# Patient Record
Sex: MALE | Race: WHITE | Employment: UNEMPLOYED | URBAN - NONMETROPOLITAN AREA
[De-identification: names, ages, dates, MRNs, and addresses within clinical notes are randomized per-mention and may not be internally consistent; named-entity substitution may affect disease eponyms.]

---

## 2017-09-01 ENCOUNTER — NURSE TRIAGE (OUTPATIENT)
Dept: ADMINISTRATIVE | Age: 27
End: 2017-09-01

## 2017-09-03 ENCOUNTER — HOSPITAL ENCOUNTER (INPATIENT)
Age: 27
LOS: 1 days | Discharge: HOME | DRG: 442 | End: 2017-09-04
Attending: EMERGENCY MEDICINE | Admitting: INTERNAL MEDICINE

## 2017-09-03 DIAGNOSIS — B17.9 ACUTE HEPATITIS: Primary | ICD-10-CM

## 2017-09-03 PROBLEM — B15.9 ACUTE HEPATITIS A: Status: ACTIVE | Noted: 2017-09-03

## 2017-09-03 PROBLEM — B19.20 HEPATITIS C: Status: ACTIVE | Noted: 2017-09-03

## 2017-09-03 PROBLEM — F19.10 POLYSUBSTANCE ABUSE (HCC): Status: ACTIVE | Noted: 2017-09-03

## 2017-09-03 LAB
ALBUMIN SERPL-MCNC: 3.3 G/DL (ref 3.5–5.1)
ALBUMIN SERPL-MCNC: 3.4 G/DL (ref 3.5–5.1)
ALBUMIN SERPL-MCNC: 3.5 G/DL (ref 3.5–5.1)
ALP BLD-CCNC: 223 U/L (ref 38–126)
ALP BLD-CCNC: 223 U/L (ref 38–126)
ALP BLD-CCNC: 247 U/L (ref 38–126)
ALT SERPL-CCNC: 1031 U/L (ref 11–66)
ALT SERPL-CCNC: 928 U/L (ref 11–66)
ALT SERPL-CCNC: 929 U/L (ref 11–66)
AMMONIA: 89 UMOL/L (ref 11–60)
AMPHETAMINE+METHAMPHETAMINE URINE SCREEN: NEGATIVE
ANION GAP SERPL CALCULATED.3IONS-SCNC: 13 MEQ/L (ref 8–16)
ANISOCYTOSIS: ABNORMAL
AST SERPL-CCNC: 141 U/L (ref 5–40)
AST SERPL-CCNC: 143 U/L (ref 5–40)
AST SERPL-CCNC: 163 U/L (ref 5–40)
BACTERIA: ABNORMAL /HPF
BARBITURATE QUANTITATIVE URINE: NEGATIVE
BASOPHILS # BLD: 1.1 %
BASOPHILS ABSOLUTE: 0 THOU/MM3 (ref 0–0.1)
BENZODIAZEPINE QUANTITATIVE URINE: NEGATIVE
BILIRUB SERPL-MCNC: 4.4 MG/DL (ref 0.3–1.2)
BILIRUB SERPL-MCNC: 4.5 MG/DL (ref 0.3–1.2)
BILIRUB SERPL-MCNC: 4.9 MG/DL (ref 0.3–1.2)
BILIRUBIN DIRECT: 3 MG/DL (ref 0–0.3)
BILIRUBIN DIRECT: 3.4 MG/DL (ref 0–0.3)
BILIRUBIN DIRECT: 3.6 MG/DL (ref 0–0.3)
BILIRUBIN URINE: ABNORMAL
BLOOD, URINE: NEGATIVE
BUN BLDV-MCNC: 7 MG/DL (ref 7–22)
CALCIUM SERPL-MCNC: 9.2 MG/DL (ref 8.5–10.5)
CANNABINOID QUANTITATIVE URINE: POSITIVE
CASTS 2: ABNORMAL /LPF
CASTS UA: ABNORMAL /LPF
CHARACTER, URINE: ABNORMAL
CHLORIDE BLD-SCNC: 100 MEQ/L (ref 98–111)
CO2: 26 MEQ/L (ref 23–33)
COCAINE METABOLITE QUANTITATIVE URINE: POSITIVE
COLOR: ABNORMAL
CREAT SERPL-MCNC: 0.8 MG/DL (ref 0.4–1.2)
CRYSTALS, UA: ABNORMAL
EOSINOPHIL # BLD: 1.1 %
EOSINOPHILS ABSOLUTE: 0 THOU/MM3 (ref 0–0.4)
EPITHELIAL CELLS, UA: ABNORMAL /HPF
GFR SERPL CREATININE-BSD FRML MDRD: > 90 ML/MIN/1.73M2
GLUCOSE BLD-MCNC: 76 MG/DL (ref 70–108)
GLUCOSE URINE: NEGATIVE MG/DL
HAV IGM SER IA-ACNC: POSITIVE
HBV SURFACE AB TITR SER: NEGATIVE {TITER}
HCT VFR BLD CALC: 39.7 % (ref 42–52)
HEMOGLOBIN: 13.3 GM/DL (ref 14–18)
HEPATITIS B CORE IGM ANTIBODY: NEGATIVE
HEPATITIS B SURFACE ANTIGEN: NEGATIVE
HEPATITIS C ANTIBODY: ABNORMAL
ICTOTEST: POSITIVE
INR BLD: 0.89 (ref 0.85–1.13)
KETONES, URINE: NEGATIVE
LEUKOCYTE ESTERASE, URINE: NEGATIVE
LIPASE: 17.2 U/L (ref 5.6–51.3)
LYMPHOCYTES # BLD: 46.8 %
LYMPHOCYTES ABSOLUTE: 1.8 THOU/MM3 (ref 1–4.8)
MCH RBC QN AUTO: 28.7 PG (ref 27–31)
MCHC RBC AUTO-ENTMCNC: 33.6 GM/DL (ref 33–37)
MCV RBC AUTO: 85.7 FL (ref 80–94)
MISCELLANEOUS 2: ABNORMAL
MONOCYTES # BLD: 16 %
MONOCYTES ABSOLUTE: 0.6 THOU/MM3 (ref 0.4–1.3)
NITRITE, URINE: NEGATIVE
NUCLEATED RED BLOOD CELLS: 0 /100 WBC
OPIATES, URINE: NEGATIVE
OSMOLALITY CALCULATION: 274.3 MOSMOL/KG (ref 275–300)
OXYCODONE: NEGATIVE
PDW BLD-RTO: 16.8 % (ref 11.5–14.5)
PH UA: 7
PHENCYCLIDINE QUANTITATIVE URINE: NEGATIVE
PLATELET # BLD: 142 THOU/MM3 (ref 130–400)
PMV BLD AUTO: 9 MCM (ref 7.4–10.4)
POTASSIUM SERPL-SCNC: 3.7 MEQ/L (ref 3.5–5.2)
PROTEIN UA: NEGATIVE
RBC # BLD: 4.63 MILL/MM3 (ref 4.7–6.1)
RBC # BLD: NORMAL 10*6/UL
RBC URINE: ABNORMAL /HPF
RENAL EPITHELIAL, UA: ABNORMAL
SEG NEUTROPHILS: 35 %
SEGMENTED NEUTROPHILS ABSOLUTE COUNT: 1.4 THOU/MM3 (ref 1.8–7.7)
SODIUM BLD-SCNC: 139 MEQ/L (ref 135–145)
SPECIFIC GRAVITY, URINE: 1.01 (ref 1–1.03)
TOTAL PROTEIN: 6.7 G/DL (ref 6.1–8)
TOTAL PROTEIN: 7.3 G/DL (ref 6.1–8)
TOTAL PROTEIN: 7.3 G/DL (ref 6.1–8)
UROBILINOGEN, URINE: 1 EU/DL
WBC # BLD: 3.9 THOU/MM3 (ref 4.8–10.8)
WBC UA: ABNORMAL /HPF
YEAST: ABNORMAL

## 2017-09-03 PROCEDURE — 2580000003 HC RX 258: Performed by: EMERGENCY MEDICINE

## 2017-09-03 PROCEDURE — 36415 COLL VENOUS BLD VENIPUNCTURE: CPT

## 2017-09-03 PROCEDURE — 83690 ASSAY OF LIPASE: CPT

## 2017-09-03 PROCEDURE — 1200000000 HC SEMI PRIVATE

## 2017-09-03 PROCEDURE — 99285 EMERGENCY DEPT VISIT HI MDM: CPT

## 2017-09-03 PROCEDURE — 80074 ACUTE HEPATITIS PANEL: CPT

## 2017-09-03 PROCEDURE — 82248 BILIRUBIN DIRECT: CPT

## 2017-09-03 PROCEDURE — 81001 URINALYSIS AUTO W/SCOPE: CPT

## 2017-09-03 PROCEDURE — 85025 COMPLETE CBC W/AUTO DIFF WBC: CPT

## 2017-09-03 PROCEDURE — 87522 HEPATITIS C REVRS TRNSCRPJ: CPT

## 2017-09-03 PROCEDURE — 82140 ASSAY OF AMMONIA: CPT

## 2017-09-03 PROCEDURE — 80307 DRUG TEST PRSMV CHEM ANLYZR: CPT

## 2017-09-03 PROCEDURE — 96360 HYDRATION IV INFUSION INIT: CPT

## 2017-09-03 PROCEDURE — 86706 HEP B SURFACE ANTIBODY: CPT

## 2017-09-03 PROCEDURE — 80053 COMPREHEN METABOLIC PANEL: CPT

## 2017-09-03 PROCEDURE — 99223 1ST HOSP IP/OBS HIGH 75: CPT | Performed by: PHYSICIAN ASSISTANT

## 2017-09-03 PROCEDURE — 2700000000 HC OXYGEN THERAPY PER DAY

## 2017-09-03 PROCEDURE — 80076 HEPATIC FUNCTION PANEL: CPT

## 2017-09-03 PROCEDURE — 2580000003 HC RX 258: Performed by: PHYSICIAN ASSISTANT

## 2017-09-03 PROCEDURE — 85610 PROTHROMBIN TIME: CPT

## 2017-09-03 PROCEDURE — 87389 HIV-1 AG W/HIV-1&-2 AB AG IA: CPT

## 2017-09-03 RX ORDER — SODIUM CHLORIDE 9 MG/ML
INJECTION, SOLUTION INTRAVENOUS CONTINUOUS
Status: DISCONTINUED | OUTPATIENT
Start: 2017-09-03 | End: 2017-09-04 | Stop reason: HOSPADM

## 2017-09-03 RX ORDER — DIAZEPAM 5 MG/ML
2.5 INJECTION, SOLUTION INTRAMUSCULAR; INTRAVENOUS EVERY 6 HOURS PRN
Status: DISCONTINUED | OUTPATIENT
Start: 2017-09-03 | End: 2017-09-04 | Stop reason: HOSPADM

## 2017-09-03 RX ORDER — IBUPROFEN 400 MG/1
400 TABLET ORAL EVERY 6 HOURS PRN
Status: DISCONTINUED | OUTPATIENT
Start: 2017-09-03 | End: 2017-09-04 | Stop reason: HOSPADM

## 2017-09-03 RX ORDER — ACETAMINOPHEN 325 MG/1
650 TABLET ORAL EVERY 4 HOURS PRN
Status: DISCONTINUED | OUTPATIENT
Start: 2017-09-03 | End: 2017-09-03 | Stop reason: SDUPTHER

## 2017-09-03 RX ORDER — SODIUM CHLORIDE 0.9 % (FLUSH) 0.9 %
10 SYRINGE (ML) INJECTION PRN
Status: DISCONTINUED | OUTPATIENT
Start: 2017-09-03 | End: 2017-09-04 | Stop reason: HOSPADM

## 2017-09-03 RX ORDER — ONDANSETRON 2 MG/ML
4 INJECTION INTRAMUSCULAR; INTRAVENOUS EVERY 30 MIN PRN
Status: DISCONTINUED | OUTPATIENT
Start: 2017-09-03 | End: 2017-09-04 | Stop reason: HOSPADM

## 2017-09-03 RX ORDER — HYDROCODONE BITARTRATE AND ACETAMINOPHEN 5; 325 MG/1; MG/1
1 TABLET ORAL EVERY 4 HOURS PRN
Status: CANCELLED | OUTPATIENT
Start: 2017-09-03

## 2017-09-03 RX ORDER — SODIUM CHLORIDE 0.9 % (FLUSH) 0.9 %
10 SYRINGE (ML) INJECTION EVERY 12 HOURS SCHEDULED
Status: DISCONTINUED | OUTPATIENT
Start: 2017-09-03 | End: 2017-09-03 | Stop reason: SDUPTHER

## 2017-09-03 RX ORDER — SODIUM CHLORIDE 0.9 % (FLUSH) 0.9 %
10 SYRINGE (ML) INJECTION PRN
Status: DISCONTINUED | OUTPATIENT
Start: 2017-09-03 | End: 2017-09-03 | Stop reason: SDUPTHER

## 2017-09-03 RX ORDER — DIPHENHYDRAMINE HCL 25 MG
50 TABLET ORAL EVERY 6 HOURS PRN
Status: DISCONTINUED | OUTPATIENT
Start: 2017-09-03 | End: 2017-09-03

## 2017-09-03 RX ORDER — SUCRALFATE ORAL 1 G/10ML
1 SUSPENSION ORAL 4 TIMES DAILY
Status: DISCONTINUED | OUTPATIENT
Start: 2017-09-03 | End: 2017-09-03

## 2017-09-03 RX ORDER — SODIUM CHLORIDE 0.9 % (FLUSH) 0.9 %
10 SYRINGE (ML) INJECTION EVERY 12 HOURS SCHEDULED
Status: DISCONTINUED | OUTPATIENT
Start: 2017-09-03 | End: 2017-09-04 | Stop reason: HOSPADM

## 2017-09-03 RX ORDER — HYDROCODONE BITARTRATE AND ACETAMINOPHEN 5; 325 MG/1; MG/1
2 TABLET ORAL EVERY 4 HOURS PRN
Status: CANCELLED | OUTPATIENT
Start: 2017-09-03

## 2017-09-03 RX ORDER — DIPHENHYDRAMINE HCL 50 MG
50 CAPSULE ORAL EVERY 6 HOURS PRN
Status: DISCONTINUED | OUTPATIENT
Start: 2017-09-03 | End: 2017-09-03 | Stop reason: SDUPTHER

## 2017-09-03 RX ORDER — ACETAMINOPHEN 325 MG/1
650 TABLET ORAL EVERY 4 HOURS PRN
Status: DISCONTINUED | OUTPATIENT
Start: 2017-09-03 | End: 2017-09-03

## 2017-09-03 RX ORDER — 0.9 % SODIUM CHLORIDE 0.9 %
1000 INTRAVENOUS SOLUTION INTRAVENOUS ONCE
Status: COMPLETED | OUTPATIENT
Start: 2017-09-03 | End: 2017-09-03

## 2017-09-03 RX ADMIN — SODIUM CHLORIDE 1000 ML: 9 INJECTION, SOLUTION INTRAVENOUS at 04:12

## 2017-09-03 RX ADMIN — SODIUM CHLORIDE: 9 INJECTION, SOLUTION INTRAVENOUS at 05:37

## 2017-09-03 RX ADMIN — SODIUM CHLORIDE: 9 INJECTION, SOLUTION INTRAVENOUS at 13:30

## 2017-09-03 RX ADMIN — Medication 10 ML: at 21:14

## 2017-09-03 ASSESSMENT — ENCOUNTER SYMPTOMS
CONSTIPATION: 0
SHORTNESS OF BREATH: 0
BLOOD IN STOOL: 1
COLOR CHANGE: 1
WHEEZING: 0
BACK PAIN: 0
VOMITING: 0
NAUSEA: 0
SORE THROAT: 0
DIARRHEA: 0
COUGH: 0
ABDOMINAL PAIN: 0

## 2017-09-03 ASSESSMENT — PAIN DESCRIPTION - DESCRIPTORS
DESCRIPTORS: ACHING;HEADACHE
DESCRIPTORS_2: ACHING;STABBING;SORE

## 2017-09-03 ASSESSMENT — PAIN DESCRIPTION - LOCATION
LOCATION_2: BACK
LOCATION: HEAD

## 2017-09-03 ASSESSMENT — PAIN DESCRIPTION - FREQUENCY: FREQUENCY: CONTINUOUS

## 2017-09-03 ASSESSMENT — PAIN DESCRIPTION - PAIN TYPE
TYPE: ACUTE PAIN
TYPE_2: CHRONIC PAIN

## 2017-09-03 ASSESSMENT — PAIN SCALES - GENERAL
PAINLEVEL_OUTOF10: 3
PAINLEVEL_OUTOF10: 0

## 2017-09-03 ASSESSMENT — PAIN DESCRIPTION - PROGRESSION: CLINICAL_PROGRESSION_2: NOT CHANGED

## 2017-09-03 ASSESSMENT — PAIN DESCRIPTION - ONSET
ONSET: ON-GOING
ONSET_2: ON-GOING

## 2017-09-03 ASSESSMENT — PAIN DESCRIPTION - INTENSITY: RATING_2: 3

## 2017-09-03 ASSESSMENT — PAIN DESCRIPTION - DURATION: DURATION_2: CONTINUOUS

## 2017-09-04 ENCOUNTER — TELEPHONE (OUTPATIENT)
Dept: INTERNAL MEDICINE CLINIC | Age: 27
End: 2017-09-04

## 2017-09-04 ENCOUNTER — APPOINTMENT (OUTPATIENT)
Dept: ULTRASOUND IMAGING | Age: 27
DRG: 442 | End: 2017-09-04

## 2017-09-04 VITALS
OXYGEN SATURATION: 98 % | DIASTOLIC BLOOD PRESSURE: 76 MMHG | RESPIRATION RATE: 16 BRPM | BODY MASS INDEX: 27.26 KG/M2 | TEMPERATURE: 98 F | HEIGHT: 74 IN | HEART RATE: 60 BPM | WEIGHT: 212.4 LBS | SYSTOLIC BLOOD PRESSURE: 126 MMHG

## 2017-09-04 LAB
ALBUMIN SERPL-MCNC: 3.1 G/DL (ref 3.5–5.1)
ALP BLD-CCNC: 248 U/L (ref 38–126)
ALT SERPL-CCNC: 640 U/L (ref 11–66)
ANION GAP SERPL CALCULATED.3IONS-SCNC: 10 MEQ/L (ref 8–16)
ANISOCYTOSIS: ABNORMAL
AST SERPL-CCNC: 104 U/L (ref 5–40)
ATYPICAL LYMPHOCYTES: ABNORMAL %
BASOPHILIA: SLIGHT
BASOPHILS # BLD: 4.5 %
BASOPHILS ABSOLUTE: 0.2 THOU/MM3 (ref 0–0.1)
BILIRUB SERPL-MCNC: 2.8 MG/DL (ref 0.3–1.2)
BILIRUBIN DIRECT: 1.6 MG/DL (ref 0–0.3)
BUN BLDV-MCNC: 6 MG/DL (ref 7–22)
CALCIUM SERPL-MCNC: 8.8 MG/DL (ref 8.5–10.5)
CHLORIDE BLD-SCNC: 107 MEQ/L (ref 98–111)
CO2: 25 MEQ/L (ref 23–33)
CREAT SERPL-MCNC: 0.7 MG/DL (ref 0.4–1.2)
EOSINOPHIL # BLD: 2.3 %
EOSINOPHILS ABSOLUTE: 0.1 THOU/MM3 (ref 0–0.4)
GFR SERPL CREATININE-BSD FRML MDRD: > 90 ML/MIN/1.73M2
GLUCOSE BLD-MCNC: 103 MG/DL (ref 70–108)
HCT VFR BLD CALC: 37.4 % (ref 42–52)
HEMOGLOBIN: 12.4 GM/DL (ref 14–18)
INR BLD: 0.89 (ref 0.85–1.13)
LYMPHOCYTES # BLD: 59.8 %
LYMPHOCYTES ABSOLUTE: 2.5 THOU/MM3 (ref 1–4.8)
MCH RBC QN AUTO: 28.5 PG (ref 27–31)
MCHC RBC AUTO-ENTMCNC: 33.3 GM/DL (ref 33–37)
MCV RBC AUTO: 85.4 FL (ref 80–94)
MONOCYTES # BLD: 14.9 %
MONOCYTES ABSOLUTE: 0.6 THOU/MM3 (ref 0.4–1.3)
NUCLEATED RED BLOOD CELLS: 0 /100 WBC
PATHOLOGIST REVIEW: ABNORMAL
PDW BLD-RTO: 16.7 % (ref 11.5–14.5)
PLATELET # BLD: 162 THOU/MM3 (ref 130–400)
PLATELET ESTIMATE: ADEQUATE
PMV BLD AUTO: 9.5 MCM (ref 7.4–10.4)
POIKILOCYTES: ABNORMAL
POTASSIUM SERPL-SCNC: 4.7 MEQ/L (ref 3.5–5.2)
RBC # BLD: 4.37 MILL/MM3 (ref 4.7–6.1)
RBC # BLD: ABNORMAL 10*6/UL
SCAN OF BLOOD SMEAR: NORMAL
SEG NEUTROPHILS: 18.5 %
SEGMENTED NEUTROPHILS ABSOLUTE COUNT: 0.8 THOU/MM3 (ref 1.8–7.7)
SODIUM BLD-SCNC: 142 MEQ/L (ref 135–145)
TARGET CELLS: ABNORMAL
TOTAL PROTEIN: 6.9 G/DL (ref 6.1–8)
WBC # BLD: 4.2 THOU/MM3 (ref 4.8–10.8)

## 2017-09-04 PROCEDURE — 76705 ECHO EXAM OF ABDOMEN: CPT

## 2017-09-04 PROCEDURE — 82248 BILIRUBIN DIRECT: CPT

## 2017-09-04 PROCEDURE — 80053 COMPREHEN METABOLIC PANEL: CPT

## 2017-09-04 PROCEDURE — 36415 COLL VENOUS BLD VENIPUNCTURE: CPT

## 2017-09-04 PROCEDURE — 85610 PROTHROMBIN TIME: CPT

## 2017-09-04 PROCEDURE — 2580000003 HC RX 258: Performed by: EMERGENCY MEDICINE

## 2017-09-04 PROCEDURE — 85025 COMPLETE CBC W/AUTO DIFF WBC: CPT

## 2017-09-04 RX ADMIN — SODIUM CHLORIDE: 9 INJECTION, SOLUTION INTRAVENOUS at 00:21

## 2017-09-04 RX ADMIN — SODIUM CHLORIDE: 9 INJECTION, SOLUTION INTRAVENOUS at 09:34

## 2017-09-04 ASSESSMENT — PAIN SCALES - GENERAL
PAINLEVEL_OUTOF10: 0

## 2017-09-06 LAB — HIV-2 AB: NEGATIVE

## 2017-09-07 LAB
EER HCV RNA QNT PCR: NORMAL
HCV RNA QNT REAL-TIME PCR INTERP: NOT DETECTED
HCV RNA QUANT BDNA IU: < 1.2 LOG IU
HCV RNA QUANT PCR IU/ML: < 15 IU/ML

## 2018-10-26 ENCOUNTER — HOSPITAL ENCOUNTER (EMERGENCY)
Age: 28
Discharge: HOME | End: 2018-10-26
Attending: FAMILY MEDICINE
Payer: MEDICAID

## 2018-10-26 VITALS
OXYGEN SATURATION: 97 % | DIASTOLIC BLOOD PRESSURE: 73 MMHG | TEMPERATURE: 97.8 F | HEIGHT: 74 IN | WEIGHT: 209 LBS | HEART RATE: 62 BPM | SYSTOLIC BLOOD PRESSURE: 130 MMHG | BODY MASS INDEX: 26.82 KG/M2 | RESPIRATION RATE: 18 BRPM

## 2018-10-26 DIAGNOSIS — G43.809 OTHER MIGRAINE WITHOUT STATUS MIGRAINOSUS, NOT INTRACTABLE: Primary | ICD-10-CM

## 2018-10-26 DIAGNOSIS — L20.9 ATOPIC DERMATITIS, UNSPECIFIED TYPE: ICD-10-CM

## 2018-10-26 PROCEDURE — 99283 EMERGENCY DEPT VISIT LOW MDM: CPT

## 2018-10-26 RX ORDER — BUTALBITAL, ACETAMINOPHEN AND CAFFEINE 300; 40; 50 MG/1; MG/1; MG/1
1 CAPSULE ORAL EVERY 4 HOURS PRN
Qty: 10 CAPSULE | Refills: 0 | Status: SHIPPED | OUTPATIENT
Start: 2018-10-26 | End: 2019-02-19 | Stop reason: ALTCHOICE

## 2018-10-27 NOTE — ED PROVIDER NOTES
Constitutional: Patient is oriented to person, place, and time. Patient appears well-developed and well-nourished. Patient is active and cooperative. HENT:   Head: Normocephalic and atraumatic. Head is without contusion. Right Ear: Hearing and external ear normal. No drainage. Left Ear: Hearing and external ear normal. No drainage. Nose: Nose normal. No nasal deformity. No epistaxis. Mouth/Throat: Mucous membranes are not dry. Eyes: EOMI. Conjunctivae, sclera, and lids are normal. Right eye exhibits no discharge. Left eye exhibits no discharge. Neck: Full passive range of motion without pain and phonation normal.   Cardiovascular:  Normal rate, regular rhythm and intact distal pulses. Pulses: Right radial pulse  2+   Pulmonary/Chest: Effort normal. No tachypnea and no bradypnea. Abdominal: Soft. Patient without distension or tenderness  Musculoskeletal:   Negative acute trauma or deformity,  apparent full range of motion and normal strength all extremities appropriate to age. Neurological: Patient is alert and oriented to person, place, and time. patient displays no tremor. Patient displays no seizure activity. Lymphatic:  No cervical adenopathy  Skin: Skin is warm and dry. Patient is not diaphoretic. there is a noted macular papular rash most prominent in the bilateral antecubital fossa, though more subtly noted on patient's back chest and abdomen, positive blanching negative vesicles or bullae, negative Nikolsky   Psychiatric: Patient has a normal mood and affect.  Patient speech is normal and behavior is normal. Cognition and memory are normal.      DIFFERENTIAL DIAGNOSIS:   Atopic dermatitis, allergic reaction NOS, migraines/headaches/cephalalgia    DIAGNOSTIC RESULTS           RADIOLOGY: non-plain film images(s) such as CT, Ultrasound and MRI are read by the radiologist.  No orders to display       LABS:   Labs Reviewed - No data to display    EMERGENCY DEPARTMENT COURSE:   Vitals: Vitals:    10/26/18 1919   BP: 130/73   Pulse: 62   Resp: 18   Temp: 97.8 °F (36.6 °C)   TempSrc: Oral   SpO2: 97%   Weight: 209 lb (94.8 kg)   Height: 6' 2\" (1.88 m)     Patient history and physical exam taken at bedside, discussed patient's symptoms and exam finds, discussed clinical diagnosis atopic dermatitis, discussed using non-perfumed non-dye soap in the shower as well as in the laundry, Benadryl as needed for any itching. Discussed patient's reported migraines, patient does admit that he thinks he may need glasses I do recommend seeing an optometrist for appropriate fitting of glasses incorrectly used glasses Contributed to eye strain and headaches, I did discuss the patient his very high caffeine intake, I also discussed inappropriate use of NSAIDs/Tylenol which can have Significant Effect on His Body, after reviewing patient's OARRS report with him noting Suboxone October 2017, I will give a very limited amount of Fioricet for migraines, patient is strongly advised on the appropriate use of this medication and stay away from any alcohol with it, follow-up with PCP, acknowledges    FINAL IMPRESSION      1. Other migraine without status migrainosus, not intractable    2.  Atopic dermatitis, unspecified type          DISPOSITION/PLAN   Discharge    PATIENT REFERRED TO:  [facility] [address] [address] [phone]  Call         DISCHARGE MEDICATIONS:  Discharge Medication List as of 10/26/2018  8:01 PM      START taking these medications    Details   butalbital-APAP-caffeine (FIORICET) -40 MG CAPS per capsule Take 1 capsule by mouth every 4 hours as needed for Headaches, Disp-10 capsule, R-0Print                 Summation      Patient Course:  Discharge    ED Medications administered this visit:  Medications - No data to display    New Prescriptions from this visit:    Discharge Medication List as of 10/26/2018  8:01 PM      START taking these medications

## 2018-11-16 ENCOUNTER — HOSPITAL ENCOUNTER (EMERGENCY)
Age: 28
Discharge: HOME | End: 2018-11-16
Payer: MEDICAID

## 2018-11-16 VITALS
TEMPERATURE: 96.4 F | RESPIRATION RATE: 21 BRPM | OXYGEN SATURATION: 99 % | SYSTOLIC BLOOD PRESSURE: 148 MMHG | DIASTOLIC BLOOD PRESSURE: 75 MMHG | HEART RATE: 94 BPM

## 2018-11-16 DIAGNOSIS — T40.1X1A ACCIDENTAL OVERDOSE OF HEROIN, INITIAL ENCOUNTER (HCC): Primary | ICD-10-CM

## 2018-11-16 PROCEDURE — 99284 EMERGENCY DEPT VISIT MOD MDM: CPT

## 2018-11-16 NOTE — ED PROVIDER NOTES
oriented ×3 was discharged home in stable condition is to follow up with the Suboxone clinic. Strict return precautions and follow up instructions were discussed with the patient with which the patient agrees    ED Medications administered this visit: Medications - No data to display    CONSULTS: (None if blank)  None    Procedures: (None if blank)       CLINICAL IMPRESSION      1.  Accidental overdose of heroin, initial encounter [facility])          DISPOSITION/PLAN   DISPOSITION Decision To Discharge 11/16/2018 01:58:22 PM      PATIENT REFERRED TO:  [facility]  [address]  [address] [place] [address]  [phone]  Schedule an appointment as soon as possible for a visit         DISCHARGE MEDICATIONS:  Current Discharge Medication List                 (Please note that portions of this note were completed with avoice recognition program.  Efforts were made to edit the dictations but occasionally words are mis-transcribed.)      Electronically signed Michael Fernando PA-C on 11/16/18 at 1:54 PM             Mesfin Holder PA-C  11/16/18 [id number]

## 2019-02-19 ENCOUNTER — HOSPITAL ENCOUNTER (EMERGENCY)
Age: 29
Discharge: HOME | End: 2019-02-19
Attending: INTERNAL MEDICINE
Payer: MEDICAID

## 2019-02-19 VITALS
OXYGEN SATURATION: 98 % | DIASTOLIC BLOOD PRESSURE: 72 MMHG | HEIGHT: 74 IN | TEMPERATURE: 97.6 F | RESPIRATION RATE: 16 BRPM | SYSTOLIC BLOOD PRESSURE: 114 MMHG | WEIGHT: 241.6 LBS | BODY MASS INDEX: 31.01 KG/M2 | HEART RATE: 104 BPM

## 2019-02-19 DIAGNOSIS — J06.9 ACUTE UPPER RESPIRATORY INFECTION: Primary | ICD-10-CM

## 2019-02-19 DIAGNOSIS — J20.9 ACUTE BRONCHITIS, UNSPECIFIED ORGANISM: ICD-10-CM

## 2019-02-19 DIAGNOSIS — Z72.0 TOBACCO ABUSE: ICD-10-CM

## 2019-02-19 LAB
DIRECT EXAM: NORMAL
Lab: NORMAL
Lab: NORMAL
SPECIMEN DESCRIPTION: NORMAL
SPECIMEN DESCRIPTION: NORMAL

## 2019-02-19 PROCEDURE — 99283 EMERGENCY DEPT VISIT LOW MDM: CPT

## 2019-02-19 PROCEDURE — 6370000000 HC RX 637 (ALT 250 FOR IP): Performed by: INTERNAL MEDICINE

## 2019-02-19 PROCEDURE — 87880 STREP A ASSAY W/OPTIC: CPT

## 2019-02-19 PROCEDURE — 87804 INFLUENZA ASSAY W/OPTIC: CPT

## 2019-02-19 RX ORDER — IBUPROFEN 200 MG
200 TABLET ORAL EVERY 6 HOURS PRN
COMMUNITY

## 2019-02-19 RX ORDER — IBUPROFEN 200 MG
600 TABLET ORAL ONCE
Status: COMPLETED | OUTPATIENT
Start: 2019-02-19 | End: 2019-02-19

## 2019-02-19 RX ADMIN — IBUPROFEN 600 MG: 200 TABLET, FILM COATED ORAL at 12:15

## 2019-02-19 ASSESSMENT — PAIN SCALES - GENERAL: PAINLEVEL_OUTOF10: 6

## 2019-03-09 ENCOUNTER — HOSPITAL ENCOUNTER (EMERGENCY)
Age: 29
Discharge: HOME | End: 2019-03-09
Attending: FAMILY MEDICINE
Payer: COMMERCIAL

## 2019-03-09 VITALS
HEIGHT: 74 IN | BODY MASS INDEX: 31.95 KG/M2 | HEART RATE: 73 BPM | OXYGEN SATURATION: 100 % | SYSTOLIC BLOOD PRESSURE: 149 MMHG | TEMPERATURE: 98.3 F | DIASTOLIC BLOOD PRESSURE: 93 MMHG | WEIGHT: 249 LBS

## 2019-03-09 DIAGNOSIS — S91.012A LACERATION OF LEFT ANKLE, INITIAL ENCOUNTER: Primary | ICD-10-CM

## 2019-03-09 PROCEDURE — 12002 RPR S/N/AX/GEN/TRNK2.6-7.5CM: CPT

## 2019-03-09 PROCEDURE — 99282 EMERGENCY DEPT VISIT SF MDM: CPT

## 2019-03-09 ASSESSMENT — PAIN DESCRIPTION - ONSET: ONSET: SUDDEN

## 2019-03-09 ASSESSMENT — PAIN - FUNCTIONAL ASSESSMENT: PAIN_FUNCTIONAL_ASSESSMENT: PREVENTS OR INTERFERES SOME ACTIVE ACTIVITIES AND ADLS

## 2019-03-09 ASSESSMENT — PAIN SCALES - GENERAL: PAINLEVEL_OUTOF10: 2

## 2019-03-09 ASSESSMENT — PAIN DESCRIPTION - DESCRIPTORS: DESCRIPTORS: BURNING;CONSTANT

## 2019-03-09 ASSESSMENT — PAIN DESCRIPTION - PAIN TYPE: TYPE: ACUTE PAIN

## 2019-03-09 ASSESSMENT — PAIN DESCRIPTION - ORIENTATION: ORIENTATION: LEFT

## 2019-03-09 ASSESSMENT — PAIN DESCRIPTION - FREQUENCY: FREQUENCY: CONTINUOUS

## 2019-03-09 ASSESSMENT — PAIN DESCRIPTION - PROGRESSION: CLINICAL_PROGRESSION: NOT CHANGED

## 2019-03-09 ASSESSMENT — PAIN DESCRIPTION - LOCATION: LOCATION: ANKLE

## 2019-04-24 ENCOUNTER — HOSPITAL ENCOUNTER (OUTPATIENT)
Age: 29
Setting detail: SPECIMEN
Discharge: HOME | End: 2019-04-24
Payer: COMMERCIAL

## 2019-04-24 PROCEDURE — 87186 SC STD MICRODIL/AGAR DIL: CPT

## 2019-04-24 PROCEDURE — 86403 PARTICLE AGGLUT ANTBDY SCRN: CPT

## 2019-04-24 PROCEDURE — 87070 CULTURE OTHR SPECIMN AEROBIC: CPT

## 2019-04-24 PROCEDURE — 87205 SMEAR GRAM STAIN: CPT

## 2019-04-27 LAB
CULTURE: ABNORMAL
DIRECT EXAM: ABNORMAL
DIRECT EXAM: ABNORMAL
Lab: ABNORMAL
SPECIMEN DESCRIPTION: ABNORMAL

## 2020-03-27 ENCOUNTER — HOSPITAL ENCOUNTER (EMERGENCY)
Age: 30
Discharge: HOME | End: 2020-03-27
Payer: MEDICAID

## 2020-03-27 VITALS
HEART RATE: 68 BPM | DIASTOLIC BLOOD PRESSURE: 90 MMHG | RESPIRATION RATE: 16 BRPM | TEMPERATURE: 98.4 F | WEIGHT: 235 LBS | OXYGEN SATURATION: 98 % | BODY MASS INDEX: 30.17 KG/M2 | SYSTOLIC BLOOD PRESSURE: 137 MMHG

## 2020-03-27 PROCEDURE — 99282 EMERGENCY DEPT VISIT SF MDM: CPT

## 2020-03-27 RX ORDER — CLINDAMYCIN HYDROCHLORIDE 300 MG/1
300 CAPSULE ORAL 3 TIMES DAILY
Qty: 30 CAPSULE | Refills: 0 | Status: SHIPPED | OUTPATIENT
Start: 2020-03-27 | End: 2020-04-06

## 2020-03-27 RX ORDER — NAPROXEN 500 MG/1
500 TABLET ORAL 2 TIMES DAILY
Qty: 14 TABLET | Refills: 0 | Status: SHIPPED | OUTPATIENT
Start: 2020-03-27

## 2020-03-27 RX ORDER — BUPRENORPHINE AND NALOXONE 8; 2 MG/1; MG/1
1 FILM, SOLUBLE BUCCAL; SUBLINGUAL DAILY
COMMUNITY

## 2020-03-27 ASSESSMENT — PAIN SCALES - GENERAL
PAINLEVEL_OUTOF10: 10
PAINLEVEL_OUTOF10: 10

## 2020-03-27 ASSESSMENT — PAIN - FUNCTIONAL ASSESSMENT: PAIN_FUNCTIONAL_ASSESSMENT: 0-10

## 2020-03-27 ASSESSMENT — PAIN DESCRIPTION - ONSET: ONSET: GRADUAL

## 2020-03-27 ASSESSMENT — PAIN DESCRIPTION - PROGRESSION: CLINICAL_PROGRESSION: GRADUALLY WORSENING

## 2020-03-27 ASSESSMENT — PAIN DESCRIPTION - DESCRIPTORS: DESCRIPTORS: ACHING

## 2020-03-27 ASSESSMENT — PAIN DESCRIPTION - LOCATION: LOCATION: MOUTH

## 2020-03-27 ASSESSMENT — PAIN DESCRIPTION - FREQUENCY: FREQUENCY: CONTINUOUS

## 2020-03-27 ASSESSMENT — PAIN DESCRIPTION - PAIN TYPE: TYPE: ACUTE PAIN

## 2020-12-03 ENCOUNTER — HOSPITAL ENCOUNTER (EMERGENCY)
Age: 30
Discharge: HOME | End: 2020-12-03
Payer: MEDICAID

## 2020-12-03 ENCOUNTER — APPOINTMENT (OUTPATIENT)
Dept: GENERAL RADIOLOGY | Age: 30
End: 2020-12-03
Payer: MEDICAID

## 2020-12-03 VITALS
HEART RATE: 99 BPM | WEIGHT: 235 LBS | DIASTOLIC BLOOD PRESSURE: 98 MMHG | HEIGHT: 74 IN | SYSTOLIC BLOOD PRESSURE: 181 MMHG | OXYGEN SATURATION: 98 % | BODY MASS INDEX: 30.16 KG/M2 | RESPIRATION RATE: 20 BRPM

## 2020-12-03 LAB
-: ABNORMAL
ACETAMINOPHEN LEVEL: <5 UG/ML (ref 10–30)
AMORPHOUS: ABNORMAL
AMPHETAMINE SCREEN URINE: POSITIVE
ANION GAP SERPL CALCULATED.3IONS-SCNC: 10 MMOL/L (ref 9–17)
BACTERIA: ABNORMAL
BARBITURATE SCREEN URINE: NEGATIVE
BENZODIAZEPINE SCREEN, URINE: POSITIVE
BILIRUBIN URINE: NEGATIVE
BUN BLDV-MCNC: 9 MG/DL (ref 6–20)
BUN/CREAT BLD: 10 (ref 9–20)
BUPRENORPHINE URINE: NEGATIVE
CALCIUM SERPL-MCNC: 9.5 MG/DL (ref 8.6–10.4)
CANNABINOID SCREEN URINE: POSITIVE
CASTS UA: ABNORMAL /LPF
CHLORIDE BLD-SCNC: 99 MMOL/L (ref 98–107)
CO2: 30 MMOL/L (ref 20–31)
COCAINE METABOLITE, URINE: NEGATIVE
COLOR: YELLOW
COMMENT UA: ABNORMAL
CREAT SERPL-MCNC: 0.92 MG/DL (ref 0.7–1.2)
CRYSTALS, UA: ABNORMAL /HPF
EPITHELIAL CELLS UA: ABNORMAL /HPF (ref 0–5)
ETHANOL PERCENT: <0.01 %
ETHANOL: <10 MG/DL
GFR AFRICAN AMERICAN: >60 ML/MIN
GFR NON-AFRICAN AMERICAN: >60 ML/MIN
GFR SERPL CREATININE-BSD FRML MDRD: ABNORMAL ML/MIN/{1.73_M2}
GFR SERPL CREATININE-BSD FRML MDRD: ABNORMAL ML/MIN/{1.73_M2}
GLUCOSE BLD-MCNC: 99 MG/DL (ref 70–99)
GLUCOSE URINE: NEGATIVE
HCT VFR BLD CALC: 36.9 % (ref 40.7–50.3)
HEMOGLOBIN: 11.8 G/DL (ref 13–17)
KETONES, URINE: NEGATIVE
LEUKOCYTE ESTERASE, URINE: NEGATIVE
MCH RBC QN AUTO: 28.5 PG (ref 25.2–33.5)
MCHC RBC AUTO-ENTMCNC: 32 G/DL (ref 28.4–34.8)
MCV RBC AUTO: 89.1 FL (ref 82.6–102.9)
MDMA URINE: ABNORMAL
METHADONE SCREEN, URINE: NEGATIVE
METHAMPHETAMINE, URINE: POSITIVE
MUCUS: ABNORMAL
NITRITE, URINE: NEGATIVE
NRBC AUTOMATED: 0 PER 100 WBC
OPIATES, URINE: NEGATIVE
OTHER OBSERVATIONS UA: ABNORMAL
OXYCODONE SCREEN URINE: NEGATIVE
PDW BLD-RTO: 13.8 % (ref 11.8–14.4)
PH UA: 7.5 (ref 5–9)
PHENCYCLIDINE, URINE: NEGATIVE
PLATELET # BLD: 203 K/UL (ref 138–453)
PMV BLD AUTO: 9.6 FL (ref 8.1–13.5)
POTASSIUM SERPL-SCNC: 3.3 MMOL/L (ref 3.7–5.3)
PROPOXYPHENE, URINE: NEGATIVE
PROTEIN UA: ABNORMAL
RBC # BLD: 4.14 M/UL (ref 4.21–5.77)
RBC UA: ABNORMAL /HPF (ref 0–2)
RENAL EPITHELIAL, UA: ABNORMAL /HPF
SALICYLATE LEVEL: <1 MG/DL (ref 3–10)
SODIUM BLD-SCNC: 139 MMOL/L (ref 135–144)
SPECIFIC GRAVITY UA: 1.02 (ref 1.01–1.02)
TEST INFORMATION: ABNORMAL
TRICHOMONAS: ABNORMAL
TRICYCLIC ANTIDEPRESSANTS, UR: NEGATIVE
TURBIDITY: ABNORMAL
URINE HGB: NEGATIVE
UROBILINOGEN, URINE: NORMAL
WBC # BLD: 4.5 K/UL (ref 3.5–11.3)
WBC UA: ABNORMAL /HPF (ref 0–5)
YEAST: ABNORMAL

## 2020-12-03 PROCEDURE — 80307 DRUG TEST PRSMV CHEM ANLYZR: CPT

## 2020-12-03 PROCEDURE — G0480 DRUG TEST DEF 1-7 CLASSES: HCPCS

## 2020-12-03 PROCEDURE — 80048 BASIC METABOLIC PNL TOTAL CA: CPT

## 2020-12-03 PROCEDURE — 81001 URINALYSIS AUTO W/SCOPE: CPT

## 2020-12-03 PROCEDURE — 85027 COMPLETE CBC AUTOMATED: CPT

## 2020-12-03 PROCEDURE — 82805 BLOOD GASES W/O2 SATURATION: CPT

## 2020-12-03 PROCEDURE — 71045 X-RAY EXAM CHEST 1 VIEW: CPT

## 2020-12-03 PROCEDURE — 99284 EMERGENCY DEPT VISIT MOD MDM: CPT

## 2020-12-03 PROCEDURE — 36415 COLL VENOUS BLD VENIPUNCTURE: CPT

## 2020-12-03 PROCEDURE — 80306 DRUG TEST PRSMV INSTRMNT: CPT

## 2020-12-03 RX ORDER — LORAZEPAM 2 MG/ML
1 INJECTION INTRAMUSCULAR ONCE
Status: DISCONTINUED | OUTPATIENT
Start: 2020-12-03 | End: 2020-12-03 | Stop reason: HOSPADM

## 2020-12-03 RX ORDER — NALOXONE HYDROCHLORIDE 4 MG/.1ML
1 SPRAY NASAL PRN
Status: DISCONTINUED | OUTPATIENT
Start: 2020-12-03 | End: 2020-12-03 | Stop reason: HOSPADM

## 2020-12-03 RX ORDER — ONDANSETRON 2 MG/ML
4 INJECTION INTRAMUSCULAR; INTRAVENOUS ONCE
Status: DISCONTINUED | OUTPATIENT
Start: 2020-12-03 | End: 2020-12-03 | Stop reason: HOSPADM

## 2020-12-03 ASSESSMENT — ENCOUNTER SYMPTOMS
NAUSEA: 0
WHEEZING: 0
CONSTIPATION: 0
RHINORRHEA: 0
BLOOD IN STOOL: 0
VOMITING: 1
SHORTNESS OF BREATH: 0
COUGH: 0
ABDOMINAL PAIN: 0
EYE REDNESS: 0
EYE DISCHARGE: 0
CHEST TIGHTNESS: 0
BACK PAIN: 0
DIARRHEA: 0
SORE THROAT: 0

## 2020-12-03 NOTE — ED NOTES
Bed: 03  Expected date:   Expected time:   Means of arrival:   Comments:  Medic 2 overdose     Mj Paez RN  12/03/20 9279

## 2020-12-03 NOTE — ED PROVIDER NOTES
Allergic/Immunologic: Negative for food allergies and immunocompromised state. Neurological: Negative for dizziness, syncope, weakness and light-headedness. Hematological: Negative for adenopathy. Does not bruise/bleed easily. Psychiatric/Behavioral: Negative for behavioral problems and suicidal ideas. The patient is not nervous/anxious. Except as noted above the remainder of the review of systems was reviewed and negative. PAST MEDICAL HISTORY     Past Medical History:   Diagnosis Date    Back pain     Drug addiction ([facility] [address]          SURGICALHISTORY     No past surgical history on file. CURRENT MEDICATIONS       Discharge Medication List as of 12/3/2020  6:50 PM      CONTINUE these medications which have NOT CHANGED    Details   buprenorphine-naloxone (SUBOXONE) 8-2 MG FILM SL film Place 1 Film under the tongue daily. Historical Med      naproxen (NAPROSYN) 500 MG tablet Take 1 tablet by mouth 2 times daily, Disp-14 tablet, R-0Normal      ibuprofen (ADVIL;MOTRIN) 200 MG tablet Take 200 mg by mouth every 6 hours as needed for PainHistorical Med             ALLERGIES     Patient has no known allergies. FAMILY HISTORY     No family history on file.        SOCIAL HISTORY       Social History     Socioeconomic History    Marital status: Single     Spouse name: Not on file    Number of children: Not on file    Years of education: Not on file    Highest education level: Not on file   Occupational History    Not on file   Social Needs    Financial resource strain: Not on file    Food insecurity     Worry: Not on file     Inability: Not on file    Transportation needs     Medical: Not on file     Non-medical: Not on file   Tobacco Use    Smoking status: Current Every Day Smoker     Packs/day: 1.00     Types: Cigarettes    Smokeless tobacco: Current User   Substance and Sexual Activity    Alcohol use: No    Drug use: No     Types: IV, Opiates , Cocaine     Comment: history of drug use  Sexual activity: Yes     Partners: Female   Lifestyle    Physical activity     Days per week: Not on file     Minutes per session: Not on file    Stress: Not on file   Relationships    Social connections     Talks on phone: Not on file     Gets together: Not on file     Attends Holiness service: Not on file     Active member of club or organization: Not on file     Attends meetings of clubs or organizations: Not on file     Relationship status: Not on file    Intimate partner violence     Fear of current or ex partner: Not on file     Emotionally abused: Not on file     Physically abused: Not on file     Forced sexual activity: Not on file   Other Topics Concern    Not on file   Social History Narrative    Not on file       SCREENINGS      @XPLE([id number])@      PHYSICAL EXAM    (up to 7 for level 4, 8 or more for level 5)     ED Triage Vitals [12/03/20 1747]   BP Temp Temp src Pulse Resp SpO2 Height Weight   (!) 210/143 -- -- 99 20 98 % 6' 2\" (1.88 m) 235 lb (106.6 kg)       Physical Exam  Vitals signs and nursing note reviewed. Constitutional:       General: He is not in acute distress. Appearance: He is well-developed. He is not diaphoretic. HENT:      Head: Normocephalic and atraumatic. Right Ear: External ear normal.      Left Ear: External ear normal.      Mouth/Throat:      Pharynx: No oropharyngeal exudate. Eyes:      General: No scleral icterus. Right eye: No discharge. Left eye: No discharge. Conjunctiva/sclera: Conjunctivae normal.      Pupils: Pupils are equal, round, and reactive to light. Neck:      Musculoskeletal: Normal range of motion and neck supple. Thyroid: No thyromegaly. Trachea: No tracheal deviation. Cardiovascular:      Rate and Rhythm: Normal rate and regular rhythm. Heart sounds: No murmur. No friction rub. No gallop. Pulmonary:      Effort: Pulmonary effort is normal. No respiratory distress.       Breath sounds: Normal breath sounds. No stridor. No wheezing. Abdominal:      General: Bowel sounds are normal. There is no distension. Palpations: Abdomen is soft. Tenderness: There is no abdominal tenderness. There is no guarding or rebound. Musculoskeletal: Normal range of motion. General: No tenderness or deformity. Lymphadenopathy:      Cervical: No cervical adenopathy. Skin:     General: Skin is warm and dry. Capillary Refill: Capillary refill takes less than 2 seconds. Findings: No erythema or rash. Neurological:      General: No focal deficit present. Mental Status: He is alert and oriented to person, place, and time. Cranial Nerves: No cranial nerve deficit. Motor: No abnormal muscle tone. Deep Tendon Reflexes: Reflexes are normal and symmetric. Reflexes normal.   Psychiatric:         Behavior: Behavior is agitated, aggressive and combative. DIAGNOSTIC RESULTS     EKG: All EKG's are interpreted by the Emergency Department Physician who either signs orCo-signs this chart in the absence of a cardiologist.      RADIOLOGY:   Non-plainfilm images such as CT, Ultrasound and MRI are read by the radiologist. Plain radiographic images are visualized and preliminarily interpreted by the emergency physician with the below findings:      Interpretationper the Radiologist below, if available at the time of this note:    XR CHEST PORTABLE   Final Result   No acute cardiopulmonary pathology.                ED BEDSIDE ULTRASOUND:   Performed by ED Physician - none    LABS:  Labs Reviewed   CBC - Abnormal; Notable for the following components:       Result Value    RBC 4.14 (*)     Hemoglobin 11.8 (*)     Hematocrit 36.9 (*)     All other components within normal limits   BASIC METABOLIC PANEL - Abnormal; Notable for the following components:    Potassium 3.3 (*)     All other components within normal limits   URINE RT REFLEX TO CULTURE - Abnormal; Notable for the following components:    Turbidity UA SLIGHTLY CLOUDY (*)     Specific Gravity, UA 1.025 (*)     Protein, UA 2+ (*)     All other components within normal limits   URINE DRUG SCREEN - Abnormal; Notable for the following components:    Amphetamine Screen, Ur POSITIVE (*)     Benzodiazepine Screen, Urine POSITIVE (*)     Cannabinoid Scrn, Ur POSITIVE (*)     Methamphetamine, Urine POSITIVE (*)     All other components within normal limits   MICROSCOPIC URINALYSIS - Abnormal; Notable for the following components:    Bacteria, UA 2+ (*)     Mucus, UA 2+ (*)     Amorphous, UA 1+ (*)     All other components within normal limits   ETHANOL   BLOOD GAS, VENOUS   SALICYLATE LEVEL   ACETAMINOPHEN LEVEL       All other labs were within normal range or not returned as of this dictation. EMERGENCY DEPARTMENT COURSE and DIFFERENTIAL DIAGNOSIS/MDM:   Vitals:    Vitals:    12/03/20 1747 12/03/20 1919   BP: (!) 210/143 (!) 181/98   Pulse: 99    Resp: 20    SpO2: 98%    Weight: 235 lb (106.6 kg)    Height: 6' 2\" (1.88 m)          MDM  60-year-old male known heroin user who reports he is on Suboxone but not today. Appears from home after EMS was called due to unresponsiveness. He was given Narcan and bystander prior to arrival.  He is awake alert oriented he was very agitated and violent with : [employer] Fleischer. He is awake alert and oriented. I discussed with him that he probably is having partial reaction secondary to the fact that he is on Suboxone and is now actively using. Will get basic blood work and monitor here. He reports this was not intentional.    Repeat evaluation patient states that he is having thoughts of possibly wanting to harm himself these seem passive. However I think he will benefit from psychiatric evaluation and even potential admission. Police are here to take him to senior care. And they are a safe site for psychiatric evaluation.   They will be calling the counseling services to have him screened when he [facility] the [facility]itent[facility]. I think he is medically stable to sit in penitentiary. I discussed with my attending who agrees. Patient will be discharged to the penitentiary where he will set and be observed my recommendations are one-to-one to ensure he does not need any further Narcan. A Narcan pack was sent with him to the penitentiary. Patient is eating and drinking laughing on his phone and appears in no distress. He is stable from an emergent medical standpoint. Procedures    FINAL IMPRESSION      1. Drug overdose, undetermined intent, initial encounter        DISPOSITION/PLAN   DISPOSITION Decision To Discharge 12/03/2020 06:44:11 PM      PATIENT REFERRED TO:  [facility] ED  [address] [facility]  [phone]    If symptoms worsen, As needed    [facility]:  Discharge Medication List as of 12/3/2020  6:50 PM                 Summation      Patient Course:      ED Medications administered this visit:    Medications   ondansetron (ZOFRAN) injection 4 mg (4 mg Intravenous Not Given 12/3/20 1845)   LORazepam (ATIVAN) injection 1 mg (1 mg Intravenous Not Given 12/3/20 1845)   naloxone (NALOXONE TO-GO) 4 mg/0.1 mL nasal spray (1 spray Nasal Furnished to Patient 12/3/20 1918)       New Prescriptions from this visit:    Discharge Medication List as of 12/3/2020  6:50 PM          Follow-up:  [facility]  [address] [facility]  [phone]    If symptoms worsen, As needed    [place] [address]  [phone]            Final Impression:   1.  Drug overdose, undetermined intent, initial encounter               (Please note that portions of this note were completed with a voice recognition program.  Efforts were made to edit the dictations but occasionally words are mis-transcribed.)           Felicia Carr PA-C  12/03/20 2002

## 2021-01-18 ENCOUNTER — HOSPITAL ENCOUNTER (OUTPATIENT)
Age: 31
Setting detail: SPECIMEN
Discharge: HOME | End: 2021-01-18
Payer: COMMERCIAL

## 2021-01-18 PROCEDURE — U0003 INFECTIOUS AGENT DETECTION BY NUCLEIC ACID (DNA OR RNA); SEVERE ACUTE RESPIRATORY SYNDROME CORONAVIRUS 2 (SARS-COV-2) (CORONAVIRUS DISEASE [COVID-19]), AMPLIFIED PROBE TECHNIQUE, MAKING USE OF HIGH THROUGHPUT TECHNOLOGIES AS DESCRIBED BY CMS-2020-01-R: HCPCS

## 2021-01-21 LAB — SARS-COV-2, NAA: NOT DETECTED

## 2023-01-06 ENCOUNTER — HOSPITAL ENCOUNTER (EMERGENCY)
Age: 33
Discharge: HOME | End: 2023-01-06
Attending: EMERGENCY MEDICINE
Payer: COMMERCIAL

## 2023-01-06 VITALS
HEART RATE: 50 BPM | OXYGEN SATURATION: 95 % | SYSTOLIC BLOOD PRESSURE: 141 MMHG | TEMPERATURE: 97.5 F | RESPIRATION RATE: 18 BRPM | DIASTOLIC BLOOD PRESSURE: 93 MMHG

## 2023-01-06 DIAGNOSIS — K02.9 PAIN DUE TO DENTAL CARIES: Primary | ICD-10-CM

## 2023-01-06 PROCEDURE — 6370000000 HC RX 637 (ALT 250 FOR IP): Performed by: EMERGENCY MEDICINE

## 2023-01-06 PROCEDURE — 99283 EMERGENCY DEPT VISIT LOW MDM: CPT

## 2023-01-06 RX ORDER — LIDOCAINE HYDROCHLORIDE 20 MG/ML
15 SOLUTION OROPHARYNGEAL ONCE
Status: COMPLETED | OUTPATIENT
Start: 2023-01-06 | End: 2023-01-06

## 2023-01-06 RX ORDER — AMOXICILLIN AND CLAVULANATE POTASSIUM 875; 125 MG/1; MG/1
1 TABLET, FILM COATED ORAL 2 TIMES DAILY
Qty: 20 TABLET | Refills: 0 | Status: SHIPPED | OUTPATIENT
Start: 2023-01-06 | End: 2023-01-06 | Stop reason: SDUPTHER

## 2023-01-06 RX ORDER — CHLORHEXIDINE GLUCONATE 0.12 MG/ML
15 RINSE ORAL 2 TIMES DAILY
Qty: 420 ML | Refills: 0 | Status: SHIPPED | OUTPATIENT
Start: 2023-01-06 | End: 2023-01-20

## 2023-01-06 RX ORDER — ACETAMINOPHEN 500 MG
1000 TABLET ORAL EVERY 6 HOURS PRN
COMMUNITY

## 2023-01-06 RX ORDER — CHLORHEXIDINE GLUCONATE 0.12 MG/ML
15 RINSE ORAL 2 TIMES DAILY
Qty: 420 ML | Refills: 0 | Status: SHIPPED | OUTPATIENT
Start: 2023-01-06 | End: 2023-01-06 | Stop reason: SDUPTHER

## 2023-01-06 RX ORDER — AMOXICILLIN AND CLAVULANATE POTASSIUM 875; 125 MG/1; MG/1
1 TABLET, FILM COATED ORAL ONCE
Status: COMPLETED | OUTPATIENT
Start: 2023-01-06 | End: 2023-01-06

## 2023-01-06 RX ORDER — AMOXICILLIN AND CLAVULANATE POTASSIUM 875; 125 MG/1; MG/1
1 TABLET, FILM COATED ORAL 2 TIMES DAILY
Qty: 20 TABLET | Refills: 0 | Status: SHIPPED | OUTPATIENT
Start: 2023-01-06 | End: 2023-01-16

## 2023-01-06 RX ADMIN — BENZOCAINE, BUTAMBEN, AND TETRACAINE HYDROCHLORIDE 1 SPRAY: .028; .004; .004 AEROSOL, SPRAY TOPICAL at 09:11

## 2023-01-06 RX ADMIN — AMOXICILLIN AND CLAVULANATE POTASSIUM 1 TABLET: 875; 125 TABLET, FILM COATED ORAL at 09:11

## 2023-01-06 RX ADMIN — LIDOCAINE HYDROCHLORIDE 15 ML: 20 SOLUTION ORAL; TOPICAL at 09:12

## 2023-01-06 ASSESSMENT — PAIN - FUNCTIONAL ASSESSMENT: PAIN_FUNCTIONAL_ASSESSMENT: 0-10

## 2023-01-06 ASSESSMENT — PAIN DESCRIPTION - ORIENTATION: ORIENTATION: RIGHT;LOWER

## 2023-01-06 ASSESSMENT — PAIN DESCRIPTION - PAIN TYPE: TYPE: CHRONIC PAIN;ACUTE PAIN

## 2023-01-06 ASSESSMENT — PAIN DESCRIPTION - FREQUENCY: FREQUENCY: CONTINUOUS

## 2023-01-06 ASSESSMENT — PAIN DESCRIPTION - DESCRIPTORS: DESCRIPTORS: SORE

## 2023-01-06 ASSESSMENT — PAIN SCALES - GENERAL: PAINLEVEL_OUTOF10: 10

## 2023-01-06 ASSESSMENT — PAIN DESCRIPTION - LOCATION: LOCATION: TEETH

## 2023-01-06 ASSESSMENT — ENCOUNTER SYMPTOMS: FACIAL SWELLING: 0

## 2023-01-06 NOTE — ED PROVIDER NOTES
[address]      Pt Name: eFlipa Johnson  MRN: [id number]  [place] 1990  Date of evaluation: 1/6/2023  Provider: Warren De Jesus DO    CHIEF COMPLAINT       Chief Complaint   Patient presents with    Dental Pain     Lower right dental pain, states he wants antibiotics. HISTORY OF PRESENT ILLNESS      Felipa Johnson is a 33 y.o. male who presents to the emergency department with complaint of dental pain that began this morning. The patient works third shift and he states that he was on his way home when his lower jaw on the right side began to hurt. The patient is on Suboxone, and reports he does not want any narcotics. He simply wants antibiotics. He already has his own dentist, he does smoke cigarettes. He reports no trismus, elevation of the tongue, facial swelling, fevers or chills. REVIEW OF SYSTEMS       Review of Systems   HENT:  Positive for dental problem. Negative for ear discharge, ear pain and facial swelling. PAST MEDICAL HISTORY     Past Medical History:   Diagnosis Date    Back pain     Drug addiction ([facility] [address]          SURGICAL HISTORY       History reviewed. No pertinent surgical history. CURRENT MEDICATIONS       Discharge Medication List as of 1/6/2023  9:48 AM        CONTINUE these medications which have NOT CHANGED    Details   acetaminophen (TYLENOL) 500 MG tablet Take 1,000 mg by mouth every 6 hours as needed for PainHistorical Med      buprenorphine-naloxone (SUBOXONE) 8-2 MG FILM SL film Place 1 Film under the tongue daily. Historical Med      naproxen (NAPROSYN) 500 MG tablet Take 1 tablet by mouth 2 times daily, Disp-14 tablet, R-0Normal      ibuprofen (ADVIL;MOTRIN) 200 MG tablet Take 200 mg by mouth every 6 hours as needed for PainHistorical Med             ALLERGIES       Patient has no known allergies. FAMILY HISTORY       History reviewed. No pertinent family history.        SOCIAL HISTORY       Social History Tobacco Use    Smoking status: Every Day     Packs/day: 1.00     Types: Cigarettes    Smokeless tobacco: Current   Vaping Use    Vaping Use: Every day   Substance Use Topics    Alcohol use: No    Drug use: No     Types: IV, Opiates , Cocaine     Comment: history of drug use         PHYSICAL EXAM       ED Triage Vitals [01/06/23 0834]   BP Temp Temp Source Heart Rate Resp SpO2 Height Weight   (!) 168/106 97.5 °F (36.4 °C) Tympanic (!) 47 18 99 % -- --       Physical Exam  Vitals and nursing note reviewed. HENT:      Head: Normocephalic. Right Ear: External ear normal.      Left Ear: External ear normal.      Mouth/Throat:      Lips: Pink. Mouth: Mucous membranes are moist. No angioedema. Dentition: Dental caries present. No dental abscesses. Tongue: No lesions. Tongue does not deviate from midline. Comments: No trismus, no elevation of the tongue, the floor of the mouth at the base the tongue is soft and not woody in texture  Eyes:      Conjunctiva/sclera: Conjunctivae normal.   Cardiovascular:      Rate and Rhythm: Regular rhythm. Bradycardia present. Pulmonary:      Effort: Pulmonary effort is normal.   Abdominal:      Palpations: Abdomen is soft. Musculoskeletal:      Cervical back: Neck supple. Skin:     General: Skin is warm and dry. Neurological:      General: No focal deficit present. Mental Status: He is alert and oriented to person, place, and time. DIAGNOSTIC RESULTS         No orders to display         LABS:  Labs Reviewed - No data to display    All other labs were within normal range or not returned as of this dictation. EMERGENCY DEPARTMENT COURSE and DIFFERENTIAL DIAGNOSIS/MDM:     This is a 64-year-old male who is presenting with dental pain likely from severe dental caries, he has multiple teeth that need to be extracted. He has his own dentist.  He does not want any narcotics. Will provide localized relief and Augmentin.           REASSESSMENT CRITICAL CARE TIME     PROCEDURES:  Unless otherwise noted below, none     Procedures    FINAL IMPRESSION      1.  Pain due to dental caries          DISPOSITION/PLAN     DISPOSITION Decision To Discharge 01/06/2023 09:17:41 AM      PATIENT REFERRED TO:  [address]  [phone]    If symptoms worsen    DISCHARGE MEDICATIONS:  Discharge Medication List as of 1/6/2023  9:48 AM          (Please note that portions of this note were completed with a voice recognition program.  Efforts were made to edit the dictations but occasionally words are mis-transcribed.)    Ray Colin DO (electronically signed)  Attending Emergency Physician           Ray Colin DO  01/06/23 9602

## 2023-01-06 NOTE — DISCHARGE INSTRUCTIONS
Make sure to take antibiotics as prescribed. Take Tylenol and Motrin alternating. Follow-up with a dentist  As we discussed if you have any worsening symptoms, have a high fever, swelling to your face especially below your jaw, have elevation of your tongue or any other concerning symptom, return here urgently.

## 2023-02-18 ENCOUNTER — HOSPITAL ENCOUNTER (EMERGENCY)
Age: 33
Discharge: HOME | End: 2023-02-18
Attending: EMERGENCY MEDICINE
Payer: COMMERCIAL

## 2023-02-18 VITALS
HEART RATE: 77 BPM | OXYGEN SATURATION: 98 % | TEMPERATURE: 97.4 F | DIASTOLIC BLOOD PRESSURE: 103 MMHG | SYSTOLIC BLOOD PRESSURE: 136 MMHG | RESPIRATION RATE: 19 BRPM

## 2023-02-18 DIAGNOSIS — K08.89 TOOTHACHE: Primary | ICD-10-CM

## 2023-02-18 PROCEDURE — 6370000000 HC RX 637 (ALT 250 FOR IP): Performed by: EMERGENCY MEDICINE

## 2023-02-18 PROCEDURE — 99283 EMERGENCY DEPT VISIT LOW MDM: CPT

## 2023-02-18 RX ORDER — AMOXICILLIN AND CLAVULANATE POTASSIUM 875; 125 MG/1; MG/1
1 TABLET, FILM COATED ORAL 2 TIMES DAILY
Qty: 20 TABLET | Refills: 0 | Status: SHIPPED | OUTPATIENT
Start: 2023-02-18 | End: 2023-02-28

## 2023-02-18 RX ORDER — AMOXICILLIN AND CLAVULANATE POTASSIUM 875; 125 MG/1; MG/1
1 TABLET, FILM COATED ORAL ONCE
Status: COMPLETED | OUTPATIENT
Start: 2023-02-18 | End: 2023-02-18

## 2023-02-18 RX ORDER — NAPROXEN 500 MG/1
500 TABLET ORAL 2 TIMES DAILY WITH MEALS
Qty: 10 TABLET | Refills: 0 | Status: SHIPPED | OUTPATIENT
Start: 2023-02-18 | End: 2023-02-23

## 2023-02-18 RX ADMIN — AMOXICILLIN AND CLAVULANATE POTASSIUM 1 TABLET: 875; 125 TABLET, FILM COATED ORAL at 18:11

## 2023-02-18 ASSESSMENT — PAIN DESCRIPTION - LOCATION: LOCATION: TEETH

## 2023-02-18 ASSESSMENT — PAIN SCALES - GENERAL: PAINLEVEL_OUTOF10: 6

## 2023-02-18 ASSESSMENT — PAIN - FUNCTIONAL ASSESSMENT: PAIN_FUNCTIONAL_ASSESSMENT: 0-10

## 2023-02-18 NOTE — DISCHARGE INSTRUCTIONS
I recommend that he follow-up with a dentist within the next 24 to 48 hours. You have been given a dental referral list which may help you locate a dentist if you are unable to follow-up with your own. As we discussed, you may also consider following up at one of the [facility] dental clinics. Please see the list of clinics available at this website:    [email or web address]    Return to the ED if you develop worsening pain, fever, difficulty swallowing, difficulty breathing or other concerns. Do not take ibuprofen or Aleve while taking the prescribed naproxen. You may continue to take Tylenol.

## 2023-02-19 NOTE — ED PROVIDER NOTES
[address]      Pt Name: Fuentes Jett  MRN: [id number]  [place] 1990  Date of evaluation: 2/18/2023  Provider: Karen Quezada MD    CHIEF COMPLAINT       Chief Complaint   Patient presents with    Dental Pain     Left and right side, ongoing, multiple broken teeth         HISTORY OF PRESENT ILLNESS      Fuentes Jett is a 33 y.o. male who presents to the emergency department for evaluation of dental pain. States that he has had pain to the bilateral upper teeth over the past few days that has been gradually worsening. He notes a history of poor dentition and states that he is supposed to have multiple teeth extracted but has not been able to do so yet because of cost.  He had a similar episode last year which improved with antibiotics. Was subsequently seen by a dentist and then referred to an oral surgeon, though has had no intervention yet. Denies any difficulty swallowing or breathing. Does have some pain with chewing. No fever. No nausea or vomiting. No other complaints. PAST MEDICAL HISTORY     Past Medical History:   Diagnosis Date    Back pain     Drug addiction [facility])        CURRENT MEDICATIONS       Discharge Medication List as of 2/18/2023  6:04 PM        CONTINUE these medications which have NOT CHANGED    Details   acetaminophen (TYLENOL) 500 MG tablet Take 1,000 mg by mouth every 6 hours as needed for PainHistorical Med      buprenorphine-naloxone (SUBOXONE) 8-2 MG FILM SL film Place 1 Film under the tongue daily. Historical Med      ibuprofen (ADVIL;MOTRIN) 200 MG tablet Take 200 mg by mouth every 6 hours as needed for PainHistorical Med             ALLERGIES       Patient has no known allergies.       SOCIAL HISTORY       Social History     Tobacco Use    Smoking status: Every Day     Packs/day: 1.00     Types: Cigarettes    Smokeless tobacco: Current   Vaping Use    Vaping Use: Every day   Substance Use Topics    Alcohol use: No    Drug use: No     Types: IV, Opiates , Cocaine     Comment: history of drug use         PHYSICAL EXAM       ED Triage Vitals   BP Temp Temp src Heart Rate Resp SpO2 Height Weight   02/18/23 1748 02/18/23 1746 -- 02/18/23 1746 02/18/23 1746 02/18/23 1746 -- --   (!) 136/103 97.4 °F (36.3 °C)  77 19 98 %         Physical Exam  Vitals reviewed. Constitutional:       General: He is not in acute distress. Appearance: He is not ill-appearing, toxic-appearing or diaphoretic. HENT:      Head: Normocephalic and atraumatic. Comments: No facial swelling     Nose: Nose normal.      Mouth/Throat:      Comments: Oral mucosa pink and moist.  No erythema or exudate. There is no trismus. No sublingual induration. No visible abscess. Patient has generally poor dentition with multiple missing teeth; most remaining teeth are in disrepair. Pulmonary:      Breath sounds: No stridor. Musculoskeletal:      Cervical back: Neck supple. No rigidity or tenderness. Lymphadenopathy:      Cervical: No cervical adenopathy. Skin:     General: Skin is warm and dry. Neurological:      General: No focal deficit present. Mental Status: He is alert and oriented to person, place, and time. EMERGENCY DEPARTMENT COURSE and DIFFERENTIAL DIAGNOSIS/MDM:     Stable and well-appearing. Presents with dental pain across multiple teeth in the upper jaw. There is no evident periapical abscess. No trismus. No other findings of concern. Suspect likely underlying infectious process. No ANUG. We will start on Augmentin. Scheduled naproxen for pain. Advised to follow-up with his dentist.        FINAL IMPRESSION      1.  Toothache          DISPOSITION/PLAN     DISPOSITION Decision To Discharge 02/18/2023 06:01:39 PM      DISCHARGE MEDICATIONS:  Discharge Medication List as of 2/18/2023  6:04 PM        START taking these medications    Details   amoxicillin-clavulanate (AUGMENTIN) 875-125 MG per tablet Take 1 tablet by mouth 2 times daily for 10 days, Disp-20 tablet, R-0Normal             (Please note that portions of this note were completed with a voice recognition program.  Efforts were made to edit the dictations but occasionally words are mis-transcribed.)    Karly Arvizu MD (electronically signed)  Attending Emergency Physician            Karly Arvizu MD  02/19/23 [id number]

## 2023-03-12 ENCOUNTER — HOSPITAL ENCOUNTER (EMERGENCY)
Age: 33
Discharge: HOME | End: 2023-03-12
Payer: COMMERCIAL

## 2023-03-12 VITALS
SYSTOLIC BLOOD PRESSURE: 146 MMHG | DIASTOLIC BLOOD PRESSURE: 95 MMHG | HEART RATE: 72 BPM | RESPIRATION RATE: 12 BRPM | TEMPERATURE: 97.2 F

## 2023-03-12 DIAGNOSIS — K08.89 PAIN, DENTAL: Primary | ICD-10-CM

## 2023-03-12 PROCEDURE — 99283 EMERGENCY DEPT VISIT LOW MDM: CPT

## 2023-03-12 RX ORDER — CLINDAMYCIN HYDROCHLORIDE 300 MG/1
300 CAPSULE ORAL 2 TIMES DAILY
Qty: 14 CAPSULE | Refills: 0 | Status: SHIPPED | OUTPATIENT
Start: 2023-03-12 | End: 2023-03-19

## 2023-03-12 ASSESSMENT — ENCOUNTER SYMPTOMS
GASTROINTESTINAL NEGATIVE: 1
RESPIRATORY NEGATIVE: 1

## 2023-03-12 NOTE — DISCHARGE INSTRUCTIONS
Follow-up with your dentist as scheduled. Take clindamycin as directed. Take Tylenol or Motrin as directed for discomfort. Promptly return to emergency department for new, changing, worsening of symptoms or other concerns.

## 2023-03-12 NOTE — ED PROVIDER NOTES
[facility] ED  EMERGENCY DEPARTMENT ENCOUNTER      Pt Name: Antony Bowen  MRN: [id number]  [place] 1990  Date of evaluation: 3/12/2023  Provider: Rhina Thapa PA-C    CHIEF COMPLAINT       Chief Complaint   Patient presents with    Dental Pain     Has multiple bad teeth. Appt 3/20 with [facility] antibioitics for pain. HISTORY OF PRESENT ILLNESS   (Location/Symptom, Timing/Onset, Context/Setting, Quality, Duration, Modifying Factors, Severity)  Note limiting factors. Antony Bowen is a 33 y.o. male who presents to the emergency department [facility] chronic dental pain reports right upper dental pain over the past 2 months that recently worsened. Patient reports he is scheduled to see a dentist to have several teeth pulled on 20 March. Patient denies history of fever facial swelling difficulty swallowing or other complaints. No other symptoms noted, patient has no additional complaints. HPI    Nursing Notes were reviewed. REVIEW OF SYSTEMS    (2-9 systems for level 4, 10 or more for level 5)     Review of Systems   Constitutional: Negative. HENT:          See hpi   Respiratory: Negative. Cardiovascular: Negative. Gastrointestinal: Negative. Genitourinary: Negative. Musculoskeletal: Negative. Skin: Negative. Except as noted above the remainder of the review of systems was reviewed and negative. PAST MEDICAL HISTORY     Past Medical History:   Diagnosis Date    Back pain     Drug addiction ([facility] [address]          SURGICAL HISTORY     History reviewed. No pertinent surgical history. CURRENT MEDICATIONS       Previous Medications    ACETAMINOPHEN (TYLENOL) 500 MG TABLET    Take 1,000 mg by mouth every 6 hours as needed for Pain    BUPRENORPHINE-NALOXONE (SUBOXONE) 8-2 MG FILM SL FILM    Place 1 Film under the tongue daily.     IBUPROFEN (ADVIL;MOTRIN) 200 MG TABLET    Take 200 mg by mouth every 6 hours as needed for Pain    NAPROXEN (NAPROSYN) 500 MG TABLET    Take 1 tablet by mouth 2 times daily (with meals) for 5 days       ALLERGIES     Patient has no known allergies. FAMILY HISTORY     History reviewed. No pertinent family history. SOCIAL HISTORY       Social History     Socioeconomic History    Marital status: Single     Spouse name: None    Number of children: None    Years of education: None    Highest education level: None   Tobacco Use    Smoking status: Every Day     Packs/day: 1.00     Types: Cigarettes    Smokeless tobacco: Current   Vaping Use    Vaping Use: Every day   Substance and Sexual Activity    Alcohol use: No    Drug use: No     Types: IV, Opiates , Cocaine     Comment: history of drug use    Sexual activity: Yes     Partners: Female       SCREENINGS         [place] Coma Scale  Eye Opening: Spontaneous  Best Verbal Response: Oriented  Best Motor Response: Obeys commands  [place] Coma Scale Score: 15                     CIWA Assessment  BP: (!) 146/95  Heart Rate: 72                 PHYSICAL EXAM    (up to 7 for level 4, 8 or more for level 5)     ED Triage Vitals   BP Temp Temp Source Heart Rate Resp SpO2 Height Weight   03/12/23 1027 03/12/23 1026 03/12/23 1026 03/12/23 1027 03/12/23 1027 -- -- --   (!) 146/95 97.2 °F (36.2 °C) Oral 72 12          Physical Exam  Vitals and nursing note reviewed. Constitutional:       General: He is not in acute distress. Appearance: Normal appearance. He is not ill-appearing, toxic-appearing or diaphoretic. HENT:      Head: Normocephalic and atraumatic. Nose: Nose normal.      Mouth/Throat:      Mouth: Mucous membranes are moist.     Eyes:      Extraocular Movements: Extraocular movements intact. Cardiovascular:      Rate and Rhythm: Normal rate and regular rhythm. Pulses: Normal pulses. Heart sounds: Normal heart sounds. Pulmonary:      Effort: Pulmonary effort is normal.      Breath sounds: Normal breath sounds.    Musculoskeletal:      Cervical back: Normal range of motion and neck supple. Skin:     General: Skin is dry. Capillary Refill: Capillary refill takes less than 2 seconds. Neurological:      General: No focal deficit present. Mental Status: He is alert. Mental status is at baseline. Psychiatric:         Mood and Affect: Mood normal.         Behavior: Behavior normal.       DIAGNOSTIC RESULTS       Interpretation per the Radiologist below, if available at the time of this note:    No orders to display         ED BEDSIDE ULTRASOUND:   Performed by ED Physician - none    LABS:  Labs Reviewed - No data to display    All other labs were within normal range or not returned as of this dictation. EMERGENCY DEPARTMENT COURSE and DIFFERENTIAL DIAGNOSIS/MDM:   Vitals:    Vitals:    03/12/23 1026 03/12/23 1027   BP:  (!) 146/95   Pulse:  72   Resp:  12   Temp: 97.2 °F (36.2 °C)    TempSrc: Oral            Medical Decision Making  Risk  Prescription drug management. 30-year-old nontoxic appearing male presents emergency department for evaluation of chronic dental pain that is worsened over the past few days. Patient is scheduled to see a dentist to have several teeth removed on 20 March. Exam demonstrates global dental decay without gingival abscess. Patient states clindamycin works well for him. I discussed with him need to keep appointment with dentist.  Patient demonstrates no dangerous process. REASSESSMENT          CRITICAL CARE TIME   Total Critical Care time was  minutes, excluding separately reportable procedures. There was a high probability of clinically significant/life threatening deterioration in the patient's condition which required my urgent intervention. CONSULTS:  None    PROCEDURES:  Unless otherwise noted below, none     Procedures        FINAL IMPRESSION      1.  Pain, dental Stable         DISPOSITION/PLAN   DISPOSITION Decision To Discharge 03/12/2023 11:18:56 AM      PATIENT REFERRED TO:  No follow-up provider specified. DISCHARGE MEDICATIONS:  New Prescriptions    CLINDAMYCIN (CLEOCIN) 300 MG CAPSULE    Take 1 capsule by mouth 2 times daily for 7 days     Controlled Substances Monitoring:     No flowsheet data found. (Please note that portions of this note were completed with a voice recognition program.  Efforts were made to edit the dictations but occasionally words are mis-transcribed.)    Zafar Barreto PA-C (electronically signed)  Attending Emergency Physician      .      Zafar Barreto PA-C  03/12/23 1124       Clare Dahl MD  03/12/23 1439

## 2024-11-06 ENCOUNTER — HOSPITAL ENCOUNTER (EMERGENCY)
Age: 34
Discharge: HOME | End: 2024-11-06
Attending: EMERGENCY MEDICINE
Payer: COMMERCIAL

## 2024-11-06 VITALS
DIASTOLIC BLOOD PRESSURE: 105 MMHG | HEART RATE: 64 BPM | SYSTOLIC BLOOD PRESSURE: 153 MMHG | OXYGEN SATURATION: 100 % | RESPIRATION RATE: 16 BRPM | TEMPERATURE: 97.2 F

## 2024-11-06 DIAGNOSIS — K02.9 DENTAL DECAY: ICD-10-CM

## 2024-11-06 DIAGNOSIS — K08.89 PAIN, DENTAL: Primary | ICD-10-CM

## 2024-11-06 DIAGNOSIS — K05.10 GINGIVITIS: ICD-10-CM

## 2024-11-06 PROCEDURE — 99283 EMERGENCY DEPT VISIT LOW MDM: CPT

## 2024-11-06 PROCEDURE — 6370000000 HC RX 637 (ALT 250 FOR IP): Performed by: EMERGENCY MEDICINE

## 2024-11-06 RX ORDER — PENICILLIN V POTASSIUM 500 MG/1
500 TABLET, FILM COATED ORAL 3 TIMES DAILY
Qty: 30 TABLET | Refills: 0 | Status: SHIPPED | OUTPATIENT
Start: 2024-11-06 | End: 2024-11-16

## 2024-11-06 RX ORDER — LIDOCAINE HYDROCHLORIDE 20 MG/ML
15 SOLUTION OROPHARYNGEAL
Status: DISCONTINUED | OUTPATIENT
Start: 2024-11-06 | End: 2024-11-06 | Stop reason: HOSPADM

## 2024-11-06 RX ORDER — CHLORHEXIDINE GLUCONATE ORAL RINSE 1.2 MG/ML
15 SOLUTION DENTAL 2 TIMES DAILY
Qty: 420 ML | Refills: 0 | Status: SHIPPED | OUTPATIENT
Start: 2024-11-06 | End: 2024-11-20

## 2024-11-06 RX ORDER — IBUPROFEN 400 MG/1
400 TABLET, FILM COATED ORAL EVERY 6 HOURS PRN
Qty: 30 TABLET | Refills: 0 | Status: SHIPPED | OUTPATIENT
Start: 2024-11-06

## 2024-11-06 RX ADMIN — LIDOCAINE HYDROCHLORIDE 15 ML: 20 SOLUTION ORAL at 12:05

## 2024-11-06 RX ADMIN — BENZOCAINE, BUTAMBEN, AND TETRACAINE HYDROCHLORIDE 10 SPRAY: .028; .004; .004 AEROSOL, SPRAY TOPICAL at 12:05

## 2024-11-06 ASSESSMENT — PAIN - FUNCTIONAL ASSESSMENT: PAIN_FUNCTIONAL_ASSESSMENT: 0-10

## 2024-11-06 ASSESSMENT — PAIN SCALES - GENERAL: PAINLEVEL_OUTOF10: 9

## 2024-11-06 ASSESSMENT — PAIN DESCRIPTION - LOCATION: LOCATION: TEETH

## 2024-11-06 NOTE — DISCHARGE INSTRUCTIONS
Take penicillin as directed until complete.  Motrin every 6-8 hours as needed for pain.  May use Tylenol as well as directed for pain control.  Use Norco place of Tylenol for pain not controlled with over-the-counter medications.  Apply a small amount of topical medication on a cotton swab or a Q-tip and leave in place for 30 minutes.  May repeat every 4-6 hours as needed for pain.  Use Peridex daily as directed.  Follow-up with a dentist as soon as possible.  Please return immediately should he develop any worsening symptoms or any other acute concerns

## 2024-11-06 NOTE — ED PROVIDER NOTES
[facility] ED  EMERGENCY DEPARTMENT ENCOUNTER      Pt Name: Damian Diana  MRN: [id number]  Birthdate 1990  Date of evaluation: 11/6/2024  Provider: Annabella Del Toro MD    CHIEF COMPLAINT       Chief Complaint   Patient presents with    Dental Pain     Right upper.  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            HISTORY OF PRESENT ILLNESS   (Location/Symptom, Timing/Onset, Context/Setting, Quality, Duration, Modifying Factors, Severity)  Note limiting factors.   Damian Diana is a 34 y.o. male who presents to the emergency department      34-year-old male presented emergency department for evaluation of right upper dental pain.  Patient has a known history of extensive dental decay.  Reports he began to have throbbing pain in his right upper jaw starting yesterday.  No facial swelling.  No redness or warmth or drainage.  No injury.  Patient did use some leftover chlorhexidine mouthwash that did help a little and has been taking Motrin but he has persistent pain this morning despite over-the-counter Motrin.  He does have a dentist to follow-up with but could not wait for an appointment due to this discomfort.  No other acute concern        Nursing Notes were reviewed.    REVIEW OF SYSTEMS    (2-9 systems for level 4, 10 or more for level 5)     Review of Systems   All other systems reviewed and are negative.      Except as noted above the remainder of the review of systems was reviewed and negative.       PAST MEDICAL HISTORY     Past Medical History:   Diagnosis Date    Back pain     Drug addiction (H[facility])          SURGICAL HISTORY     No past surgical history on file.      CURRENT MEDICATIONS       Previous Medications    ACETAMINOPHEN (TYLENOL) 500 MG TABLET    Take 1,000 mg by mouth every 6 hours as needed for Pain    BUPRENORPHINE-NALOXONE (SUBOXONE) 8-2 MG FILM SL FILM    Place 1 Film under the tongue daily.    IBUPROFEN (ADVIL;MOTRIN) 200 MG

## 2025-05-22 ENCOUNTER — OFFICE VISIT (OUTPATIENT)
Dept: PRIMARY CARE CLINIC | Age: 35
End: 2025-05-22
Payer: COMMERCIAL

## 2025-05-22 ENCOUNTER — HOSPITAL ENCOUNTER (EMERGENCY)
Age: 35
Discharge: HOME | End: 2025-05-22

## 2025-05-22 VITALS
DIASTOLIC BLOOD PRESSURE: 88 MMHG | BODY MASS INDEX: 32.79 KG/M2 | HEIGHT: 73 IN | OXYGEN SATURATION: 97 % | WEIGHT: 247.4 LBS | HEART RATE: 85 BPM | SYSTOLIC BLOOD PRESSURE: 132 MMHG

## 2025-05-22 DIAGNOSIS — Z76.89 ENCOUNTER TO ESTABLISH CARE: Primary | ICD-10-CM

## 2025-05-22 DIAGNOSIS — R10.31 ACUTE ABDOMINAL PAIN IN RIGHT LOWER QUADRANT: ICD-10-CM

## 2025-05-22 PROCEDURE — 4004F PT TOBACCO SCREEN RCVD TLK: CPT | Performed by: NURSE PRACTITIONER

## 2025-05-22 PROCEDURE — G8427 DOCREV CUR MEDS BY ELIG CLIN: HCPCS | Performed by: NURSE PRACTITIONER

## 2025-05-22 PROCEDURE — G2211 COMPLEX E/M VISIT ADD ON: HCPCS | Performed by: NURSE PRACTITIONER

## 2025-05-22 PROCEDURE — G8419 CALC BMI OUT NRM PARAM NOF/U: HCPCS | Performed by: NURSE PRACTITIONER

## 2025-05-22 PROCEDURE — 99215 OFFICE O/P EST HI 40 MIN: CPT | Performed by: NURSE PRACTITIONER

## 2025-05-22 RX ORDER — ONDANSETRON 4 MG/1
4 TABLET, FILM COATED ORAL EVERY 8 HOURS PRN
COMMUNITY
Start: 2025-04-15

## 2025-05-22 SDOH — ECONOMIC STABILITY: FOOD INSECURITY: WITHIN THE PAST 12 MONTHS, THE FOOD YOU BOUGHT JUST DIDN'T LAST AND YOU DIDN'T HAVE MONEY TO GET MORE.: NEVER TRUE

## 2025-05-22 SDOH — ECONOMIC STABILITY: FOOD INSECURITY: WITHIN THE PAST 12 MONTHS, YOU WORRIED THAT YOUR FOOD WOULD RUN OUT BEFORE YOU GOT MONEY TO BUY MORE.: NEVER TRUE

## 2025-05-22 ASSESSMENT — PATIENT HEALTH QUESTIONNAIRE - PHQ9
9. THOUGHTS THAT YOU WOULD BE BETTER OFF DEAD, OR OF HURTING YOURSELF: NOT AT ALL
4. FEELING TIRED OR HAVING LITTLE ENERGY: SEVERAL DAYS
SUM OF ALL RESPONSES TO PHQ QUESTIONS 1-9: 5
10. IF YOU CHECKED OFF ANY PROBLEMS, HOW DIFFICULT HAVE THESE PROBLEMS MADE IT FOR YOU TO DO YOUR WORK, TAKE CARE OF THINGS AT HOME, OR GET ALONG WITH OTHER PEOPLE: SOMEWHAT DIFFICULT
SUM OF ALL RESPONSES TO PHQ QUESTIONS 1-9: 5
6. FEELING BAD ABOUT YOURSELF - OR THAT YOU ARE A FAILURE OR HAVE LET YOURSELF OR YOUR FAMILY DOWN: NOT AT ALL
2. FEELING DOWN, DEPRESSED OR HOPELESS: NOT AT ALL
8. MOVING OR SPEAKING SO SLOWLY THAT OTHER PEOPLE COULD HAVE NOTICED. OR THE OPPOSITE, BEING SO FIGETY OR RESTLESS THAT YOU HAVE BEEN MOVING AROUND A LOT MORE THAN USUAL: NOT AT ALL
1. LITTLE INTEREST OR PLEASURE IN DOING THINGS: SEVERAL DAYS
5. POOR APPETITE OR OVEREATING: SEVERAL DAYS
SUM OF ALL RESPONSES TO PHQ QUESTIONS 1-9: 5
SUM OF ALL RESPONSES TO PHQ QUESTIONS 1-9: 5
7. TROUBLE CONCENTRATING ON THINGS, SUCH AS READING THE NEWSPAPER OR WATCHING TELEVISION: SEVERAL DAYS
3. TROUBLE FALLING OR STAYING ASLEEP: SEVERAL DAYS

## 2025-05-22 ASSESSMENT — ENCOUNTER SYMPTOMS
ABDOMINAL DISTENTION: 0
ABDOMINAL PAIN: 1
NAUSEA: 1
DIARRHEA: 1
VOMITING: 1

## 2025-05-22 NOTE — PROGRESS NOTES
[facility]      Patient:  Damian Diana 34 y.o. male       Date of Service: 05/22/25      Chief Complaint:   Chief Complaint   Patient presents with    New Patient    Establish Care     Patient presents today to establish care.  Previously seen Dr. Osorio when he was a child. He used the ER a lot.    Abdominal Pain     Patient thought he had norovirus and he gets like a sensation of hurt burn, puking,diarrhea (10-11 yesterday), not drinking much. Before he came here he took a drink of water and it was hurting his stomach and his girlfriend and him stopped at [employer] in [place] and he took a drink of her slushy and he states it hurts all over his stomach and cramping.  Started: On and off four times within the last month.  Methadone clinic he takes a liquid and was started two weeks ago. Started 25 and is now up to 95.       History of Present Illness   Abdominal pain  - Off and on for a month  - nausea, vomiting, and diarrhea   - Burning sensation with eating and drinking        Allergies:    Patient has no known allergies.    Problem List:  Patient Active Problem List   Diagnosis    Acute hepatitis A    Acute hepatitis    Hepatitis C    Polysubstance abuse (HCC)    Tobacco use        Medication List:    Prior to Admission medications    Medication Sig Start Date End Date Taking? Authorizing Provider   ondansetron (ZOFRAN) 4 MG tablet Take 1 tablet by mouth every 8 hours as needed 4/15/25  Yes Provider, MD Jose   ibuprofen (IBU) 400 MG tablet Take 1 tablet by mouth every 6 hours as needed for Pain 11/6/24  Yes Annabella Del Toro MD       Surgical History:  History reviewed. No pertinent surgical history.     Family History:  Family History   Problem Relation Age of Onset    Dementia Maternal Grandmother     No Known Problems Paternal Grandmother     No Known Problems Paternal Grandfather         Social History:  Social History     Socioeconomic History    Marital status: Single     Spouse

## 2025-05-22 NOTE — ED TRIAGE NOTES
Patient called into Triage Room. Patient walks into triage room is pale and diaphoretic. He ask how long this will take. This writer responded with approximately 3 hours. Patient then looks at the 2 women with him and turns around decides to not be seen at this time and walks out of the ER

## 2025-05-23 ENCOUNTER — TELEPHONE (OUTPATIENT)
Dept: PRIMARY CARE CLINIC | Age: 35
End: 2025-05-23